# Patient Record
Sex: MALE | Race: WHITE | NOT HISPANIC OR LATINO | URBAN - NONMETROPOLITAN AREA
[De-identification: names, ages, dates, MRNs, and addresses within clinical notes are randomized per-mention and may not be internally consistent; named-entity substitution may affect disease eponyms.]

---

## 2019-05-30 ENCOUNTER — IMPORTED ENCOUNTER (OUTPATIENT)
Dept: URBAN - NONMETROPOLITAN AREA CLINIC 1 | Facility: CLINIC | Age: 59
End: 2019-05-30

## 2019-05-30 PROBLEM — H02.88B: Noted: 2019-05-30

## 2019-05-30 PROBLEM — H02.88A: Noted: 2019-05-30

## 2019-05-30 PROCEDURE — 99202 OFFICE O/P NEW SF 15 MIN: CPT

## 2019-06-20 ENCOUNTER — IMPORTED ENCOUNTER (OUTPATIENT)
Dept: URBAN - NONMETROPOLITAN AREA CLINIC 1 | Facility: CLINIC | Age: 59
End: 2019-06-20

## 2019-06-20 PROBLEM — H52.12: Noted: 2019-06-20

## 2019-06-20 PROBLEM — H52.01: Noted: 2019-06-20

## 2019-06-20 PROBLEM — H52.223: Noted: 2019-06-20

## 2019-06-20 PROBLEM — H02.88A: Noted: 2019-05-30

## 2019-06-20 PROBLEM — H25.813: Noted: 2019-06-20

## 2019-06-20 PROBLEM — H02.88B: Noted: 2019-05-30

## 2019-06-20 PROBLEM — H52.4: Noted: 2019-06-20

## 2019-06-20 PROCEDURE — S0621 ROUTINE OPHTHALMOLOGICAL EXA: HCPCS

## 2019-06-20 NOTE — PATIENT DISCUSSION
MGD-Pt instructed on lid scrubs and warm compresses. -d/c Tobradex 1 gt OU X10 days -START doxycycline 100 mg po-Continue to monitorCataract(s)-Visually significant.-Cataract(s) causing symptomatic impairment of visual function not correctable with a tolerable change in glasses or contact lenses lighting or non-operative means resulting in specific activity limitations and/or participation restrictions including but not limited to reading viewing television driving or meeting vocational or recreational needs. -Expectation is clearer vision and reduced glare disability after cataract removal.-Pt to try specs first.Myopia-Discussed diagnosis with patient. -Explained that people who are myopic are at a higher risk for developing RD/RT and reviewed associated S&S.-Pt to contact our office if symptoms develop. Hyperopia-Discussed diagnosis with patient. Astigmatism-Discussed diagnosis with patient. Presbyopia-Discussed diagnosis with patient. Updated spec Rx given. Recommend lens that will provide comfort as well as protect safety and health of eyes. RTC 1 yr CEE

## 2019-09-10 ENCOUNTER — IMPORTED ENCOUNTER (OUTPATIENT)
Dept: URBAN - NONMETROPOLITAN AREA CLINIC 1 | Facility: CLINIC | Age: 59
End: 2019-09-10

## 2019-09-10 PROCEDURE — 99212 OFFICE O/P EST SF 10 MIN: CPT

## 2019-09-10 NOTE — PATIENT DISCUSSION
Diplopia -Order OCT MAC performed and reviewed w/pt -Continue to monitorMGD-Pt instructed on lid scrubs and warm compresses. -Cont doxycycline 100 mg po-Continue to monitorCataract(s)-Visually significant.-Cataract(s) causing symptomatic impairment of visual function not correctable with a tolerable change in glasses or contact lenses lighting or non-operative means resulting in specific activity limitations and/or participation restrictions including but not limited to reading viewing television driving or meeting vocational or recreational needs. -Expectation is clearer vision and reduced glare disability after cataract removal.-Refer pt for cataract evaluationRTC Cat Livier

## 2019-09-11 PROBLEM — H25.813: Noted: 2019-09-11

## 2019-09-11 PROBLEM — H02.88A: Noted: 2019-09-11

## 2019-09-11 PROBLEM — H53.2: Noted: 2019-09-11

## 2019-09-11 PROBLEM — H02.88B: Noted: 2019-09-11

## 2022-04-09 ASSESSMENT — VISUAL ACUITY
OS_CC: 20/25
OS_CC: 20/25
OS_CC: 20/40+3
OD_PH: 20/25
OD_CC: 20/40
OD_CC: 20/40-2
OD_PH: 20/25-
OD_CC: 20/40+

## 2022-04-09 ASSESSMENT — TONOMETRY
OD_IOP_MMHG: 14
OD_IOP_MMHG: 19
OS_IOP_MMHG: 17
OD_IOP_MMHG: 14
OS_IOP_MMHG: 18
OS_IOP_MMHG: 14